# Patient Record
Sex: MALE
[De-identification: names, ages, dates, MRNs, and addresses within clinical notes are randomized per-mention and may not be internally consistent; named-entity substitution may affect disease eponyms.]

---

## 2024-03-21 ENCOUNTER — APPOINTMENT (OUTPATIENT)
Dept: ORTHOPEDIC SURGERY | Facility: CLINIC | Age: 39
End: 2024-03-21
Payer: COMMERCIAL

## 2024-03-21 VITALS
WEIGHT: 176.37 LBS | SYSTOLIC BLOOD PRESSURE: 113 MMHG | BODY MASS INDEX: 23.89 KG/M2 | HEIGHT: 72 IN | HEART RATE: 51 BPM | DIASTOLIC BLOOD PRESSURE: 63 MMHG | OXYGEN SATURATION: 97 %

## 2024-03-21 DIAGNOSIS — Z80.9 FAMILY HISTORY OF MALIGNANT NEOPLASM, UNSPECIFIED: ICD-10-CM

## 2024-03-21 DIAGNOSIS — Z78.9 OTHER SPECIFIED HEALTH STATUS: ICD-10-CM

## 2024-03-21 DIAGNOSIS — Z82.62 FAMILY HISTORY OF OSTEOPOROSIS: ICD-10-CM

## 2024-03-21 DIAGNOSIS — Z82.61 FAMILY HISTORY OF ARTHRITIS: ICD-10-CM

## 2024-03-21 DIAGNOSIS — S93.492A SPRAIN OF OTHER LIGAMENT OF LEFT ANKLE, INITIAL ENCOUNTER: ICD-10-CM

## 2024-03-21 PROBLEM — Z00.00 ENCOUNTER FOR PREVENTIVE HEALTH EXAMINATION: Status: ACTIVE | Noted: 2024-03-21

## 2024-03-21 PROCEDURE — 99203 OFFICE O/P NEW LOW 30 MIN: CPT

## 2024-03-24 NOTE — PHYSICAL EXAM
[de-identified] : General: Well-nourished, well-developed, alert, and in no acute distress. Head: Normocephalic. Eyes: Pupils equal, extraocular muscles intact, normal sclera. Nose: No nasal discharge. Cardiovascular: Extremities are warm and well perfused. Distal pulses are symmetric bilaterally. Respiratory: No labored breathing. Extremities: Sensation is intact distally bilaterally. Distal pulses are symmetric bilaterally Lymphatic: No regional lymphadenopathy, no lymphedema Neurologic: No focal deficits Skin: Normal skin color, texture, and turgor Psychiatric: Normal affect  Examination of left ankle Gait slightly antalgic Able to toe walk, heel walk Inspection: Mild effusion No ecchymosis, Tender to palpation: ATFL, peroneals Nontender to palpation: medial malleolus, lateral malleolus, base of 5th metatarsal, navicular, CFL, PTFL, AITFL, Achilles tendon, PT, FHL, tibialis anterior, plantar fascia  ROM: Plantar flexion 45 deg, dorsiflexion 20 deg, inversion 20 deg, eversion 30 deg Special Tests:  Anterior Drawer (ATFL): negative for pain and laxity Talar Tilt (CFL): negative for pain and laxity Kleigers (ext rot): negative for pain and laxity at deltoid ligament or distal fibula Squeeze test: negative at proximal or distal syndesmosis Bump test: negative at talar dome, syndesmosis Matos test negative  Sensation is intact to light touch over the superficial and deep peroneal nerve distributions and the posterior tibial nerve distribution. Capillary refill is less than two seconds. Posterior tibial and dorsalis pedis pulses 2+ equal bilaterally. No calf swelling or tenderness bilaterally. Strength testing shows 5/5 Quads, 5/5 Hamstrings, 5/5 EHL, 5/5 FHL, 5/5 tibialis anterior, 5/5 gastroc-soleus complex.  Reflexes: Patellar 2+, Achilles 2+.

## 2024-03-24 NOTE — CONSULT LETTER
[Dear  ___] : Dear  [unfilled], [Consult Letter:] : I had the pleasure of evaluating your patient, [unfilled]. [Please see my note below.] : Please see my note below. [Consult Closing:] : Thank you very much for allowing me to participate in the care of this patient.  If you have any questions, please do not hesitate to contact me. [Sincerely,] : Sincerely, [FreeTextEntry3] : Diamond Jc MD

## 2024-03-24 NOTE — HISTORY OF PRESENT ILLNESS
[de-identified] : SEAN RUDOLPH is a 38 year old male who presents with left ankle pain. States the onset of pain was 1 week ago. Sustained inversion ankle injury whilst running in [place]. Was unable to weight bear and had swelling. Limped to a clinic but insurance was not accepted. They provided him with crutches. Presented to [facility] had XR (negative for fracture). Pain is anterolateral Pain is nonradiating. There is associated mild  swelling There is associated numbness, paraesthesia or weakness. Exacerbating factors are standing, walking for prolonged periods. Has tried rest, ice, elevation, ibuprofen

## 2024-03-24 NOTE — ASSESSMENT
[FreeTextEntry1] : SEAN RUDOLPH is a 38 year old male with left ankle pain. I discussed with the patient that their symptoms, signs, and imaging are most consistent with ATFL sprain. We reviewed the natural history of this condition and treatment options. We agreed on the following plan:  Xray reviewed today. Start Home Exercises for foot and ankle conditioning. Demonstration and handout provided.  Physical therapy. Referral provided. Medication: Ibuprofen prn Kinesio-tape applied today ACE wrap provided. Advised lace up/velcro ankle brace. Examples provided. Advanced imaging: consider MRI if no symptomatic improvement.  Follow up in 6-8 weeks.

## 2024-04-25 ENCOUNTER — APPOINTMENT (OUTPATIENT)
Dept: ORTHOPEDIC SURGERY | Facility: CLINIC | Age: 39
End: 2024-04-25

## 2024-05-02 ENCOUNTER — APPOINTMENT (OUTPATIENT)
Dept: ORTHOPEDIC SURGERY | Facility: CLINIC | Age: 39
End: 2024-05-02
Payer: COMMERCIAL

## 2024-05-02 VITALS
OXYGEN SATURATION: 96 % | HEART RATE: 71 BPM | HEIGHT: 72 IN | BODY MASS INDEX: 23.84 KG/M2 | WEIGHT: 176 LBS | SYSTOLIC BLOOD PRESSURE: 118 MMHG | DIASTOLIC BLOOD PRESSURE: 82 MMHG

## 2024-05-02 DIAGNOSIS — S93.492D SPRAIN OF OTHER LIGAMENT OF LEFT ANKLE, SUBSEQUENT ENCOUNTER: ICD-10-CM

## 2024-05-02 PROCEDURE — 99213 OFFICE O/P EST LOW 20 MIN: CPT

## 2024-05-02 NOTE — PHYSICAL EXAM
[de-identified] : General: Well-nourished, well-developed, alert, and in no acute distress. Head: Normocephalic. Eyes: Pupils equal, extraocular muscles intact, normal sclera. Nose: No nasal discharge. Cardiovascular: Extremities are warm and well perfused. Distal pulses are symmetric bilaterally. Respiratory: No labored breathing. Extremities: Sensation is intact distally bilaterally. Distal pulses are symmetric bilaterally Lymphatic: No regional lymphadenopathy, no lymphedema Neurologic: No focal deficits Skin: Normal skin color, texture, and turgor Psychiatric: Normal affect  Examination of left ankle Gait normal Able to toe walk, heel walk, single leg hop Ambulating independently Inspection: no ecchymosis, effusion  Nontender to palpation: medial malleolus, lateral malleolus, base of 5th metatarsal, navicular, ATFL, CFL, PTFL, AITFL, Achilles tendon, PT, FHL, tibialis anterior, peroneals, plantar fascia   ROM: Plantar flexion 45 deg, dorsiflexion 20 deg, inversion 20 deg, eversion 20 deg Special Tests: Anterior Drawer (ATFL): negative for pain and laxity Talar Tilt (CFL): negative for pain and laxity Kleigers (ext rot): negative for pain and laxity at deltoid ligament or distal fibula Squeeze test: negative at proximal or distal syndesmosis Bump test: negative at talar dome, syndesmosis Matos test negative   Sensation is intact to light touch over the superficial and deep peroneal nerve distributions and the posterior tibial nerve distribution. Capillary refill is less than two seconds. Posterior tibial and dorsalis pedis pulses 2+ equal bilaterally. No calf swelling or tenderness bilaterally. Strength testing shows 5/5 Quads, 5/5 Hamstrings, 5/5 EHL, 5/5 FHL, 5/5 tibialis anterior, 5/5 gastroc-soleus complex. Reflexes: Patellar 2+, Achilles 2+.

## 2024-05-02 NOTE — ASSESSMENT
[FreeTextEntry1] : SEAN RUDOLPH is a 39 year old male with left ankle pain.     I discussed with the patient that their symptoms, signs, and imaging are most consistent with ATFL sprain. We reviewed the natural history of this condition and treatment options. We agreed on the following plan:  Encouraged to continue home exercises per handout. Continue physical therapy. Gradual reintroduction of higher impact aerobic activities. Recommend 150 min per week of moderate intensity aerobic activity  Can try lace up/velcro brace. Follow up as needed.

## 2024-05-02 NOTE — HISTORY OF PRESENT ILLNESS
[de-identified] : SEAN RUDOLPH is a 39 year old male presents to follow up left ankle pain. Last visit was 03/21/24. Patient has been performing home exercises and attending PT. Pain has mostly resolved.